# Patient Record
(demographics unavailable — no encounter records)

---

## 2025-07-21 NOTE — HISTORY OF PRESENT ILLNESS
[Gradual] : gradual [Radiating] : radiating [Tightness] : tightness [Nothing helps with pain getting better] : Nothing helps with pain getting better [Full time] : Work status: full time [de-identified] : 07/21/2025: 58 y/o RHD male presents for evaluation of the ribs. He had chest CT performed for cardiac follow up, which demonstrated new fracture/lesions of the left 2nd and 3rd ribs. Denies any discomfort or pain with inspiration. He also notes intermittent pain and locking of the right middle finger for the past 6 months. No specific injury. Worse with grasping. No treatment so far. Denies history of prior injury. [] : no [FreeTextEntry5] : No injury back in 2014 had stent put in and had bypass but nothing showed up until the CT was done 7-9-25 [FreeTextEntry6] : rib pain level of pain is 0, finger locks up and throbs [FreeTextEntry7] : rt carlos [de-identified] : gripping  [de-identified] : chest CT

## 2025-07-21 NOTE — ASSESSMENT
[FreeTextEntry1] : 07/21/2025: RT hand x-rays, 3 views, reveals no fx/abnormalities. Reviewed [facility] radiology CT scan from 07/09/25 which reveals lesions at the left third anterior rib and rib fracture.  Underlying pathology reviewed and treatment options discussed. The CT scan shows lesions and patient reports no history of trauma. I reached out and spoke to his cardiologist on the phone.  Patient notes that he does not have an internist. He will follow up with orthopedic oncology and an oncologist to eval for METS.  We discussed risk and benefits of CSI. Patient elected to proceed with steroid injection today 3rd trigger finger - tolerated well. Ice if sore. Activity modification as tolerated. Questions addressed.  The documentation recorded by the scribe accurately reflects the service I personally performed and the decisions made by me. I, Esther Brown, attest that this documentation has been prepared under the direction and in the presence of Provider Chano Grant MD.   The patient was seen by Chano Grant MD.

## 2025-07-21 NOTE — PROCEDURE
[FreeTextEntry3] : Tendon Sheath Injection was performed in the 3rd trigger finger because of pain and inflammation. Anesthesia: ethyl chloride sprayed topically. Celestone 6m cc. Lidocaine 1%: 1 cc.  The risks, benefits, and alternatives to cortisone injection were explained in full to the patient. Risks outlined include but are not limited to infection, sepsis, bleeding, scarring, skin discoloration, temporary increase in pain, syncopal episode, failure to resolve symptoms, allergic reaction, and symptom recurrence. Patient understands the risks. All questions were answered. After discussion of options, patient requested an injection. Oral informed consent was obtained. Sterile technique was utilized for the procedure including the preparation of the solutions used for the injection. Injection site was prepped with betadine and alcohol. Ethyl chloride sprayed topically. Sterile technique used. Patient tolerated procedure well.   Post Procedure Instructions: Patient was advised to call if redness, pain, or fever occur. Apply ice for 15 min. every 2 hours for the next 12-24 hours as tolerated. Patient was advised to rest the joint(s) for 1-2 days.

## 2025-07-21 NOTE — REVIEW OF SYSTEMS
[Joint Pain] : joint pain [Joint Swelling] : joint swelling [Negative] : Heme/Lymph [FreeTextEntry9] : finger

## 2025-07-21 NOTE — PHYSICAL EXAM
[Right] : right hand [A1-Pulley] : A1-pulley [3rd] : 3rd [MCP Joint] : MCP joint [FreeTextEntry8] : Tenderness at the left second rib and mid axillary line.  [] : no erythema

## 2025-07-21 NOTE — DISCUSSION/SUMMARY
[de-identified] : The patient was advised of the diagnosis. The natural history of the pathology was explained in full to the patient in layman's terms. The risks and benefits of surgical and non-surgical treatment alternatives were explained in full to the patient. All questions were answered.

## 2025-07-28 NOTE — HISTORY OF PRESENT ILLNESS
[FreeTextEntry1] : Mr. HERNANDEZ is a 59-year-old male referred by Dr. Grant for consultation regarding a rib fracture.    Past Medical History is significant for HLD, CAD, Trigger finger, Subclavian bypass with Coarct Repair ([place], 2014, Richard/Vinay). TEVAR 2024 7/9/25 CTA Chest Aorta with IV contrast at [facility]  -Stable postoperative changes of endovascular stent grafting of the transverse arch and descending thoracic aorta with patent inflow and outflow. No pseudoaneurysm or endoleak identified. Ascending thoracic aorta is again dilated measuring up to 41mm.  No visualized dissection. Descending thoracic aorta is again dilated, unchanged.  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 The stent does not appear patent although there is distal flow of the visualized left subclavian artery.  -There is a new fracture of the left second rib with a lytic focus noted in this region with partial callus healing. Additional new lucent lesion seen within the left third anterior rib without oral disruption.   Today he reports incidental finding of lytic lesions on the ribs. Denies injury, but does have slight pain at times near the subclavian bypass scar. He has had some tightness in the chest with walking at times.

## 2025-07-28 NOTE — DATA REVIEWED
[FreeTextEntry1] : Independent review of imaging and independent interpretation was performed at today's visit. -7/9/25 CTA Chest Aorta with IV contrast at [facility]

## 2025-07-28 NOTE — HISTORY OF PRESENT ILLNESS
[FreeTextEntry1] : Mr. HERNANDEZ is a 59-year-old male referred by Dr. Grant for consultation regarding a rib fracture.    Past Medical History is significant for HLD, CAD, Trigger finger, Subclavian bypass with Coarct Repair ([place], 2014, Richard/Vinay). TEVAR 2024 7/9/25 CTA Chest Aorta with IV contrast at [facility]  -Stable postoperative changes of endovascular stent grafting of the transverse arch and descending thoracic aorta with patent inflow and outflow. No pseudoaneurysm or endoleak identified. Ascending thoracic aorta is again dilated measuring up to 41mm.  No visualized dissection. Descending thoracic aorta is again dilated, unchanged.  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 The stent does not appear patent although there is distal flow of the visualized left subclavian artery.  -There is a new fracture of the left second rib with a lytic focus noted in this region with partial callus healing. Additional new lucent lesion seen within the left third anterior rib without oral disruption.   Today he reports incidental finding of lytic lesions on the ribs. Denies injury, but does have slight pain at times near the subclavian bypass scar. He has had some tightness in the chest with walking at times.

## 2025-07-28 NOTE — ASSESSMENT
[FreeTextEntry1] : Sukhjinder is a 59-year-old male with a history of coarctation that was repaired endovascularly in the past in addition to a carotid subclavian bypass.  He was recently found to have a left second rib fracture of unclear etiology as he has had no history of trauma.  I will be arranging a PET scan to better evaluate the region and determine if additional imaging is necessary.  Thank you for allowing me to dissipate in the care of your patient  45 minutes was spent during this encounter.   Henry Clay MD Department of Cardiovascular and Thoracic Surgery  [employer] at [facility]

## 2025-07-28 NOTE — ASSESSMENT
[FreeTextEntry1] : Sukhjinder is a 59-year-old male with a history of coarctation that was repaired endovascularly in the past in addition to a carotid subclavian bypass.  He was recently found to have a left second rib fracture of unclear etiology as he has had no history of trauma.  I will be arranging a PET scan to better evaluate the region and determine if additional imaging is necessary.  Thank you for allowing me to dissipate in the care of your patient  45 minutes was spent during this encounter.   Henry Clay MD Department of Cardiovascular and Thoracic Surgery  [employer] at [facility]

## 2025-07-28 NOTE — REVIEW OF SYSTEMS
[Negative] : Heme/Lymph [Chest Pain] : chest pain [Fever] : no fever [Chills] : no chills [Feeling Tired] : not feeling tired [Palpitations] : no palpitations [Lower Ext Edema] : no extremity edema [FreeTextEntry5] : Chest tightness which is mild and resolves with rest, he had it before prior to a stent.

## 2025-07-28 NOTE — DATA REVIEWED
[FreeTextEntry1] : Independent review of imaging and independent interpretation was performed at today's visit. -7/9/25 CTA Chest Aorta with IV contrast at [facility]